# Patient Record
Sex: FEMALE | Race: WHITE | NOT HISPANIC OR LATINO | Employment: UNEMPLOYED | URBAN - METROPOLITAN AREA
[De-identification: names, ages, dates, MRNs, and addresses within clinical notes are randomized per-mention and may not be internally consistent; named-entity substitution may affect disease eponyms.]

---

## 2019-12-20 PROBLEM — K59.04 CHRONIC IDIOPATHIC CONSTIPATION: Status: ACTIVE | Noted: 2019-12-20

## 2019-12-20 PROBLEM — R10.9 ABDOMINAL PAIN IN FEMALE: Status: ACTIVE | Noted: 2018-06-06

## 2019-12-20 PROBLEM — Z12.11 COLON CANCER SCREENING: Status: ACTIVE | Noted: 2019-12-20

## 2020-09-09 PROBLEM — R41.82 ALTERED MENTAL STATUS: Status: ACTIVE | Noted: 2020-09-09

## 2020-09-09 PROBLEM — E03.9 HYPOTHYROIDISM: Status: ACTIVE | Noted: 2020-09-09

## 2020-09-12 PROBLEM — G93.40 ACUTE ENCEPHALOPATHY: Status: ACTIVE | Noted: 2020-09-12

## 2020-09-12 PROBLEM — I63.9 CVA (CEREBRAL VASCULAR ACCIDENT): Status: ACTIVE | Noted: 2020-09-12

## 2020-09-13 PROBLEM — I63.232 CEREBROVASCULAR ACCIDENT (CVA) DUE TO STENOSIS OF LEFT CAROTID ARTERY: Status: ACTIVE | Noted: 2020-09-12

## 2020-09-13 PROBLEM — I10 HYPERTENSION: Status: ACTIVE | Noted: 2020-09-13

## 2021-01-26 PROBLEM — G93.40 ACUTE ENCEPHALOPATHY: Status: RESOLVED | Noted: 2020-09-12 | Resolved: 2021-01-26

## 2021-01-26 PROBLEM — R41.82 ALTERED MENTAL STATUS: Status: RESOLVED | Noted: 2020-09-09 | Resolved: 2021-01-26

## 2021-05-12 ENCOUNTER — PATIENT MESSAGE (OUTPATIENT)
Dept: RESEARCH | Facility: HOSPITAL | Age: 81
End: 2021-05-12

## 2021-12-12 PROBLEM — M79.604 BILATERAL LEG PAIN: Status: ACTIVE | Noted: 2021-12-12

## 2021-12-12 PROBLEM — M79.605 BILATERAL LEG PAIN: Status: ACTIVE | Noted: 2021-12-12

## 2022-03-14 PROBLEM — K64.9 HEMORRHOID: Status: ACTIVE | Noted: 2022-03-14

## 2022-03-14 LAB — CRC RECOMMENDATION EXT: NORMAL

## 2022-03-28 ENCOUNTER — TELEPHONE (OUTPATIENT)
Dept: TRANSPLANT | Facility: CLINIC | Age: 82
End: 2022-03-28
Payer: MEDICARE

## 2022-03-28 NOTE — TELEPHONE ENCOUNTER
----- Message from Clarence Griffin sent at 3/28/2022  1:19 PM CDT -----    Hepatology referral received and scanned into media; pt chart sent to referral nurse for medical review.       Referring Provider: SLIME Arias   Phone: [phone]   Fax: [phone]                .

## 2022-04-04 ENCOUNTER — TELEPHONE (OUTPATIENT)
Dept: TRANSPLANT | Facility: CLINIC | Age: 82
End: 2022-04-04
Payer: MEDICARE

## 2022-04-04 NOTE — TELEPHONE ENCOUNTER
----- Message from Clarence Griffin sent at 3/28/2022  1:19 PM CDT -----    Hepatology referral received and scanned into media; pt chart sent to referral nurse for medical review.       Referring Provider: SLIME Arias   Phone: [phone]   Fax: [phone]                .

## 2022-04-04 NOTE — LETTER
April 4, 2022    Jazlyn Vazquez  [address] [address]      Dear Jazlyn Vazquez:    Your doctor has referred you to the [facility]. We are sending this letter to remind you to make an appointment with us to complete the referral process.     Please call us at [phone] to schedule an appointment. We look forward to seeing you soon.     If you received a call and have been scheduled, please disregard this letter.       Sincerely,        [facility]   [facility] [place] [address]  [phone]

## 2022-04-04 NOTE — TELEPHONE ENCOUNTER
Pt records reviewed.  Pt will be referred to Hepatology due to elevated lfts ALKP  190/ast 83/ast 63/TB 0.4  Initial referral received  from Jignesh PALENCIA    Pt to be scheduled in liver clinic in [place]       RECORDS SCANNED IN MEDIA UNDER HEPATOLOGY REFERRAL .

## 2022-06-01 PROBLEM — K73.9 CHRONIC HEPATITIS: Status: ACTIVE | Noted: 2022-06-01

## 2022-12-14 PROBLEM — R42 DIZZINESS OF UNKNOWN CAUSE: Status: ACTIVE | Noted: 2022-12-14

## 2023-01-03 ENCOUNTER — PATIENT OUTREACH (OUTPATIENT)
Dept: ADMINISTRATIVE | Facility: HOSPITAL | Age: 83
End: 2023-01-03
Payer: MEDICARE

## 2023-01-03 NOTE — PROGRESS NOTES
Population Health Outreach.Records Received, hyper-linked into chart at this time. The following record(s)  below were uploaded for Health Maintenance .    3/14/2022-colonoscopy

## 2023-11-10 PROBLEM — H25.812 COMBINED FORM OF AGE-RELATED CATARACT, LEFT EYE: Status: ACTIVE | Noted: 2023-11-10
